# Patient Record
Sex: FEMALE | Race: WHITE | NOT HISPANIC OR LATINO | URBAN - METROPOLITAN AREA
[De-identification: names, ages, dates, MRNs, and addresses within clinical notes are randomized per-mention and may not be internally consistent; named-entity substitution may affect disease eponyms.]

---

## 2018-07-25 NOTE — PATIENT DISCUSSION
No Retinal holes, Tears or Detachments. Yes. Per pt, when she began in October she was placed on Azithromycin (which may increase INR) and Ethambutol (which has no effect on INR). Rifampin (which may lower INR) was added in November. She is still on all three, and anticipates continuing until December of this year. Thank you, Mayra

## 2019-03-18 NOTE — PATIENT DISCUSSION
Monitor. Size Of Lesion In Cm (Optional): 0.5 Detail Level: Detailed X Size Of Lesion In Cm (Optional): 0.4

## 2019-11-14 ENCOUNTER — IMPORTED ENCOUNTER (OUTPATIENT)
Dept: URBAN - METROPOLITAN AREA CLINIC 50 | Facility: CLINIC | Age: 64
End: 2019-11-14

## 2022-02-08 NOTE — PROCEDURE NOTE: SURGICAL
<p>Prior to commencing surgery patient identification, surgical procedure, site, and side were confirmed by Dr. Chuy Carrera. Following topical proparacaine anesthesia, the patient was positioned at the YAG laser, a contact lens coupled to the cornea with methylcellulose and an axial posterior capsulotomy performed without complication using 3.6 Mj x 30. Excess methylcellulose was washed from the eye, one drop of Alphagan was instilled and the patient returned to the holding area having tolerated the procedure well and without complication. </p><p>MRN#[id number]</p>

## 2022-03-07 NOTE — PROCEDURE NOTE: SURGICAL
<p>Prior to commencing surgery patient identification, surgical procedure, site, and side were confirmed by Dr. Ermelinda Rocha. Following topical proparacaine anesthesia, the patient was positioned at the YAG laser, a contact lens coupled to the cornea with methylcellulose and an axial posterior capsulotomy performed without complication using 3.2 Mj x 29. Excess methylcellulose was washed from the eye, one drop of Alphagan was instilled and the patient returned to the holding area having tolerated the procedure well and without complication. </p><p>MRN [id number]</p> Pt came in to see Dr. Hernandez today.

## 2024-03-06 NOTE — PATIENT DISCUSSION
Monitor.
No Retinal holes, Tears or Detachments.
Patient understands condition, prognosis and need for follow up care.
Reassess YAG PRN.
Recommended artificial tears and warm compresses.
Retinal tear and detachment warning symptoms reviewed and patient instructed to call immediately if increasing floaters, flashes, or decreasing peripheral vision.
The patient is status post Yag laser Capsulotomy in the left eye. The vision shows nice improvement.
Well healed.
Home